# Patient Record
Sex: MALE | Race: WHITE | HISPANIC OR LATINO | Employment: UNEMPLOYED | URBAN - METROPOLITAN AREA
[De-identification: names, ages, dates, MRNs, and addresses within clinical notes are randomized per-mention and may not be internally consistent; named-entity substitution may affect disease eponyms.]

---

## 2023-06-09 ENCOUNTER — HOSPITAL ENCOUNTER (EMERGENCY)
Facility: CLINIC | Age: 43
Discharge: HOME | End: 2023-06-09
Attending: PHYSICIAN ASSISTANT | Admitting: PHYSICIAN ASSISTANT

## 2023-06-09 VITALS
TEMPERATURE: 98.3 F | HEART RATE: 72 BPM | DIASTOLIC BLOOD PRESSURE: 72 MMHG | SYSTOLIC BLOOD PRESSURE: 118 MMHG | OXYGEN SATURATION: 98 % | HEIGHT: 67 IN | RESPIRATION RATE: 18 BRPM

## 2023-06-09 DIAGNOSIS — S61.216A LACERATION OF RIGHT LITTLE FINGER WITHOUT DAMAGE TO NAIL, FOREIGN BODY PRESENCE UNSPECIFIED, INITIAL ENCOUNTER: ICD-10-CM

## 2023-06-09 PROCEDURE — 12002 RPR S/N/AX/GEN/TRNK2.6-7.5CM: CPT

## 2023-06-09 PROCEDURE — 250N000011 HC RX IP 250 OP 636: Performed by: PHYSICIAN ASSISTANT

## 2023-06-09 PROCEDURE — 99283 EMERGENCY DEPT VISIT LOW MDM: CPT

## 2023-06-09 PROCEDURE — 90715 TDAP VACCINE 7 YRS/> IM: CPT | Performed by: PHYSICIAN ASSISTANT

## 2023-06-09 PROCEDURE — 90471 IMMUNIZATION ADMIN: CPT | Performed by: PHYSICIAN ASSISTANT

## 2023-06-09 RX ORDER — BUPIVACAINE HYDROCHLORIDE 5 MG/ML
5 INJECTION, SOLUTION PERINEURAL ONCE
Status: DISCONTINUED | OUTPATIENT
Start: 2023-06-09 | End: 2023-06-09

## 2023-06-09 RX ORDER — BUPIVACAINE HYDROCHLORIDE 5 MG/ML
5 INJECTION, SOLUTION EPIDURAL; INTRACAUDAL ONCE
Status: DISCONTINUED | OUTPATIENT
Start: 2023-06-09 | End: 2023-06-09 | Stop reason: HOSPADM

## 2023-06-09 RX ADMIN — CLOSTRIDIUM TETANI TOXOID ANTIGEN (FORMALDEHYDE INACTIVATED), CORYNEBACTERIUM DIPHTHERIAE TOXOID ANTIGEN (FORMALDEHYDE INACTIVATED), BORDETELLA PERTUSSIS TOXOID ANTIGEN (GLUTARALDEHYDE INACTIVATED), BORDETELLA PERTUSSIS FILAMENTOUS HEMAGGLUTININ ANTIGEN (FORMALDEHYDE INACTIVATED), BORDETELLA PERTUSSIS PERTACTIN ANTIGEN, AND BORDETELLA PERTUSSIS FIMBRIAE 2/3 ANTIGEN 0.5 ML: 5; 2; 2.5; 5; 3; 5 INJECTION, SUSPENSION INTRAMUSCULAR at 18:33

## 2023-06-09 ASSESSMENT — ACTIVITIES OF DAILY LIVING (ADL): ADLS_ACUITY_SCORE: 33

## 2023-06-09 NOTE — ED PROVIDER NOTES
"  History     Chief Complaint:  Laceration     Pleasant 43 y/o RHD Irish speaking male presents with his son (who is translating) for evaluation of right small finger laceration sustained just PTA while at home.  Cutting a piece of plastic with his pocket knife, slipped and cut finger.  Denies trouble moving finger.  Very distal finger tip feels a little tingly.  Unsure last TET.      Not anticoagulated  No hx of DM    Local resident    Son at BS  No PCP       The history is provided by the patient and medical records. A  was used (Irish, son directly translating (they decline interp service)).      Independent Historian:   Patient provides history (son translating)    Review of External Notes:   No records in [facility]/ for [facility].     Medications:    No current outpatient medications on file.    Past Medical History:    No past medical history on file.    Past Surgical History:    No past surgical history on file.     Physical Exam   Patient Vitals for the past 24 hrs:   BP Temp Temp src Pulse Resp SpO2 Height   06/09/23 1544 118/72 98.3  F (36.8  C) Temporal 72 18 98 % 1.702 m (5' 7\")      Physical Exam  Vitals and nursing note reviewed.   Constitutional:       General: He is not in acute distress.     Appearance: Normal appearance. He is not ill-appearing, toxic-appearing or diaphoretic.   HENT:      Head: Normocephalic.      Right Ear: External ear normal.      Left Ear: External ear normal.      Mouth/Throat:      Mouth: Mucous membranes are moist.   Eyes:      Conjunctiva/sclera: Conjunctivae normal.   Pulmonary:      Effort: Pulmonary effort is normal.   Musculoskeletal:         General: Normal range of motion.      Cervical back: Normal range of motion.      Comments: Right hand/small finger: see pic.  ~3-4 cm linear oblique lying laceration over volar surface of distal finger tip, crossed PIP. No active bleeding.  Can F/E finger against resistance.  Sensation grossly intact " to light touch to finger tip.  Brisk cap refill.     Skin:     General: Skin is warm.      Capillary Refill: Capillary refill takes less than 2 seconds.   Neurological:      General: No focal deficit present.      Mental Status: He is alert.   Psychiatric:         Mood and Affect: Mood normal.         Behavior: Behavior normal.           Emergency Department Course   Procedures     Laceration Repair      Procedure: Laceration Repair    Indication: Laceration    Consent: Verbal    Location: Right R fifth (pinky) finger    Length: ~3 cm    Preparation: Irrigation with Sterile Saline.    Anesthesia/Sedation: Digital block: 1% lido plain and 0.5% marcaine plain, 1:1 ratio, 5mls.  Betadine prep.  LET also applied directly to wound area.      Treatment/Exploration: Wound explored, no foreign bodies found Through skin into subcutaneous only, not through.     Closure: The wound was closed with one layer. Skin/superficial layer was closed with 4 x 5-0 Nylon using Interrupted sutures.     Patient Status: The patient tolerated the procedure well: Yes. There were no complications.    Emergency Department Course & Assessments:       Interventions:  Medications   lido-EPINEPHrine-tetracaine (LET) topical gel GEL (has no administration in time range)   lidocaine 1 % 5 mL (has no administration in time range)   Tdap (tetanus-diphtheria-acell pertussis) (ADACEL) injection 0.5 mL (has no administration in time range)   bupivacaine (MARCAINE) 0.5% preservative free injection (has no administration in time range)      Assessments:  1637: Eval   1721: Digital block to finger.   1813: Lac repair. Discussed dispo plan.     Social Determinants of Health affecting care:   Language barrier  No primary care provider established    Disposition:  The patient was discharged to home.     Impression & Plan      Medical Decision Making:  Pleasant 41 y/o RHD Bahraini speaking male presents with his son (who is translating) for evaluation of right  small finger laceration sustained just PTA while at home.  Cutting a piece of plastic with his pocket knife, slipped and cut finger.  Denies trouble moving finger.  Very distal finger tip feels a little tingly.  Unsure last TET.      Exam as above.  Full function of finger and N/V intact (reports mild tingling to finger tip but has sensation to light touch).  Will update TET.  Block finger.  Explore wound, irrigate and repair.  Not felt XR will  as no concern for Fx/FB with mechanism/exam.  They are happy with plan.     Update: wound explored and repaired as above.  Wound into subcutaneous not through, no tendon injury visualized (wound not to that depth) or FB visualized, irrigated by ED tech and again by me.  Closed as above. Discussed importance of obtaining a PCP, provided contact to obtain. Discussed sutures should be removed in 7-10 days with new PCP clinic, any local [facility], or can return here if needed.  Pt educated on S/S of infection and the importance of wound care.  Pt educated on S/S that should prompt ED re-eval.  Questions answered. Verbalized understanding. Comfortable with plan and appreciative.   18 y/o son remains at  and provides translation.     Diagnosis:    ICD-10-CM    1. Laceration of right little finger without damage to nail, foreign body presence unspecified, initial encounter  S61.216A          Discharge Medications:  New Prescriptions    No medications on file      Maura Chand PA-C  6/9/2023   Maura Chand PA-C Medure, Leah M, PA-C  06/09/23 0787

## 2023-06-09 NOTE — ED TRIAGE NOTES
Pt arrives ambulatory for right pinky laceration that occurred while using a  around 1400. Reports slightly diminished sensation in tip of right pinky and pain that radiates to wrist 4/10. No allergies and reports no daily medications. Unsure of last tetanus shot. 1.5cm laceration noted to right pinky. Cleaned and dressed in triage.

## 2023-06-09 NOTE — DISCHARGE INSTRUCTIONS
EXPLANATION:  The provider has evaluated and repaired your finger laceration with 4 sutures that should be removed in 7-10 days.     HOME CARE:  Wash the wound daily with warm water and dial soap.  Pat dry.  Apply a thin layer of topical antibiotic ointment to the wound area (bacitracin, available over the counter).  Then apply new/clean dressing.  You may take Motrin and/or Tylenol as needed for discomfort.  These medications are available over the counter, use as directed on the label.    RETURN to the EMERGENCY DEPARTMENT if you develop any of the following:  Fever (temp 100.4 or higher)  Redness, swelling, or pus drainage from the wound  Red streak traveling up your hand from the wound area  Trouble moving your finger or hand due to pain, swelling, or weakness  Trouble breathing  Confusion  Vomiting    The examination and treatment you have received in the Emergency Department has been rendered on an emergency basis only and not intended to be a substitute for complete ongoing medical care.

## 2023-06-09 NOTE — ED NOTES
Emergency Department Technician Wound Irrigation Note:    6/9/2023    6:08 PM      Wound location:  finger    Irrigation Fluid: Normal Saline    Estimated Irrigation Volume (60 mL fluid per cm): 100mL    Jennifer Vargas

## 2023-06-17 ENCOUNTER — HOSPITAL ENCOUNTER (EMERGENCY)
Facility: CLINIC | Age: 43
Discharge: HOME | End: 2023-06-17
Admitting: EMERGENCY MEDICINE

## 2023-06-17 VITALS
HEART RATE: 91 BPM | RESPIRATION RATE: 17 BRPM | OXYGEN SATURATION: 96 % | DIASTOLIC BLOOD PRESSURE: 80 MMHG | SYSTOLIC BLOOD PRESSURE: 118 MMHG | TEMPERATURE: 98.4 F

## 2023-06-17 PROCEDURE — 99281 EMR DPT VST MAYX REQ PHY/QHP: CPT

## 2023-06-18 NOTE — ED TRIAGE NOTES
Pt presents for suture removal. Pt states he has had stitches in for 8 days. Finger w/ stitches appears clean, intact and no signs of infection. RN removed 4 stitches.

## 2025-03-29 ENCOUNTER — APPOINTMENT (OUTPATIENT)
Dept: CT IMAGING | Facility: CLINIC | Age: 45
End: 2025-03-29
Attending: EMERGENCY MEDICINE

## 2025-03-29 ENCOUNTER — HOSPITAL ENCOUNTER (EMERGENCY)
Facility: CLINIC | Age: 45
Discharge: HOME | End: 2025-03-29
Attending: EMERGENCY MEDICINE | Admitting: EMERGENCY MEDICINE

## 2025-03-29 ENCOUNTER — APPOINTMENT (OUTPATIENT)
Dept: MRI IMAGING | Facility: CLINIC | Age: 45
End: 2025-03-29
Attending: PHYSICIAN ASSISTANT

## 2025-03-29 VITALS
RESPIRATION RATE: 18 BRPM | HEART RATE: 75 BPM | TEMPERATURE: 97.1 F | BODY MASS INDEX: 34.94 KG/M2 | DIASTOLIC BLOOD PRESSURE: 88 MMHG | WEIGHT: 235.89 LBS | SYSTOLIC BLOOD PRESSURE: 119 MMHG | OXYGEN SATURATION: 98 % | HEIGHT: 69 IN

## 2025-03-29 DIAGNOSIS — G51.0 BELL'S PALSY: ICD-10-CM

## 2025-03-29 LAB
ANION GAP SERPL CALCULATED.3IONS-SCNC: 10 MMOL/L (ref 7–15)
APTT PPP: 29 SECONDS (ref 22–38)
BASOPHILS # BLD AUTO: 0.1 10E3/UL (ref 0–0.2)
BASOPHILS NFR BLD AUTO: 1 %
BUN SERPL-MCNC: 14.6 MG/DL (ref 6–20)
CALCIUM SERPL-MCNC: 8.6 MG/DL (ref 8.8–10.4)
CHLORIDE SERPL-SCNC: 101 MMOL/L (ref 98–107)
CREAT SERPL-MCNC: 0.8 MG/DL (ref 0.67–1.17)
EGFRCR SERPLBLD CKD-EPI 2021: >90 ML/MIN/1.73M2
EOSINOPHIL # BLD AUTO: 0.7 10E3/UL (ref 0–0.7)
EOSINOPHIL NFR BLD AUTO: 8 %
ERYTHROCYTE [DISTWIDTH] IN BLOOD BY AUTOMATED COUNT: 12.6 % (ref 10–15)
GLUCOSE SERPL-MCNC: 102 MG/DL (ref 70–99)
HCO3 SERPL-SCNC: 23 MMOL/L (ref 22–29)
HCT VFR BLD AUTO: 40.7 % (ref 40–53)
HGB BLD-MCNC: 14.2 G/DL (ref 13.3–17.7)
HOLD SPECIMEN: NORMAL
IMM GRANULOCYTES # BLD: 0 10E3/UL
IMM GRANULOCYTES NFR BLD: 0 %
INR PPP: 0.98 (ref 0.85–1.15)
LYMPHOCYTES # BLD AUTO: 3.2 10E3/UL (ref 0.8–5.3)
LYMPHOCYTES NFR BLD AUTO: 38 %
MCH RBC QN AUTO: 29.8 PG (ref 26.5–33)
MCHC RBC AUTO-ENTMCNC: 34.9 G/DL (ref 31.5–36.5)
MCV RBC AUTO: 85 FL (ref 78–100)
MONOCYTES # BLD AUTO: 0.6 10E3/UL (ref 0–1.3)
MONOCYTES NFR BLD AUTO: 8 %
NEUTROPHILS # BLD AUTO: 3.8 10E3/UL (ref 1.6–8.3)
NEUTROPHILS NFR BLD AUTO: 45 %
NRBC # BLD AUTO: 0 10E3/UL
NRBC BLD AUTO-RTO: 0 /100
PLATELET # BLD AUTO: 276 10E3/UL (ref 150–450)
POTASSIUM SERPL-SCNC: 3.5 MMOL/L (ref 3.4–5.3)
RBC # BLD AUTO: 4.77 10E6/UL (ref 4.4–5.9)
SODIUM SERPL-SCNC: 134 MMOL/L (ref 135–145)
TROPONIN T SERPL HS-MCNC: <6 NG/L
WBC # BLD AUTO: 8.5 10E3/UL (ref 4–11)

## 2025-03-29 PROCEDURE — 85610 PROTHROMBIN TIME: CPT | Performed by: EMERGENCY MEDICINE

## 2025-03-29 PROCEDURE — A9585 GADOBUTROL INJECTION: HCPCS | Performed by: PHYSICIAN ASSISTANT

## 2025-03-29 PROCEDURE — 250N000012 HC RX MED GY IP 250 OP 636 PS 637: Performed by: EMERGENCY MEDICINE

## 2025-03-29 PROCEDURE — 99291 CRITICAL CARE FIRST HOUR: CPT | Mod: 25

## 2025-03-29 PROCEDURE — 80048 BASIC METABOLIC PNL TOTAL CA: CPT | Performed by: EMERGENCY MEDICINE

## 2025-03-29 PROCEDURE — 82962 GLUCOSE BLOOD TEST: CPT

## 2025-03-29 PROCEDURE — 84484 ASSAY OF TROPONIN QUANT: CPT | Performed by: EMERGENCY MEDICINE

## 2025-03-29 PROCEDURE — 85014 HEMATOCRIT: CPT | Performed by: EMERGENCY MEDICINE

## 2025-03-29 PROCEDURE — 70553 MRI BRAIN STEM W/O & W/DYE: CPT

## 2025-03-29 PROCEDURE — 36415 COLL VENOUS BLD VENIPUNCTURE: CPT | Performed by: EMERGENCY MEDICINE

## 2025-03-29 PROCEDURE — G0508 CRIT CARE TELEHEA CONSULT 60: HCPCS | Mod: FS | Performed by: PHYSICIAN ASSISTANT

## 2025-03-29 PROCEDURE — 250N000009 HC RX 250: Performed by: EMERGENCY MEDICINE

## 2025-03-29 PROCEDURE — 70450 CT HEAD/BRAIN W/O DYE: CPT

## 2025-03-29 PROCEDURE — 85004 AUTOMATED DIFF WBC COUNT: CPT | Performed by: EMERGENCY MEDICINE

## 2025-03-29 PROCEDURE — 85730 THROMBOPLASTIN TIME PARTIAL: CPT | Performed by: EMERGENCY MEDICINE

## 2025-03-29 PROCEDURE — 93005 ELECTROCARDIOGRAM TRACING: CPT

## 2025-03-29 PROCEDURE — 255N000002 HC RX 255 OP 636: Performed by: PHYSICIAN ASSISTANT

## 2025-03-29 PROCEDURE — 250N000011 HC RX IP 250 OP 636: Performed by: EMERGENCY MEDICINE

## 2025-03-29 PROCEDURE — 70496 CT ANGIOGRAPHY HEAD: CPT

## 2025-03-29 RX ORDER — IOPAMIDOL 755 MG/ML
67 INJECTION, SOLUTION INTRAVASCULAR ONCE
Status: COMPLETED | OUTPATIENT
Start: 2025-03-29 | End: 2025-03-29

## 2025-03-29 RX ORDER — PREDNISONE 20 MG/1
60 TABLET ORAL ONCE
Status: COMPLETED | OUTPATIENT
Start: 2025-03-29 | End: 2025-03-29

## 2025-03-29 RX ORDER — PREDNISONE 20 MG/1
60 TABLET ORAL DAILY
Qty: 18 TABLET | Refills: 0 | Status: SHIPPED | OUTPATIENT
Start: 2025-03-30 | End: 2025-04-05

## 2025-03-29 RX ORDER — LIGHT MINERAL OIL, WHITE PETROLATUM 150; 850 MG/G; MG/G
OINTMENT OPHTHALMIC
Qty: 1 G | Refills: 0 | Status: SHIPPED | OUTPATIENT
Start: 2025-03-29

## 2025-03-29 RX ORDER — VALACYCLOVIR HYDROCHLORIDE 1 G/1
1000 TABLET, FILM COATED ORAL 3 TIMES DAILY
Qty: 21 TABLET | Refills: 0 | Status: SHIPPED | OUTPATIENT
Start: 2025-03-30 | End: 2025-04-06

## 2025-03-29 RX ORDER — IOPAMIDOL 755 MG/ML
500 INJECTION, SOLUTION INTRAVASCULAR ONCE
Status: DISCONTINUED | OUTPATIENT
Start: 2025-03-29 | End: 2025-03-29

## 2025-03-29 RX ORDER — GADOBUTROL 604.72 MG/ML
11 INJECTION INTRAVENOUS ONCE
Status: COMPLETED | OUTPATIENT
Start: 2025-03-29 | End: 2025-03-29

## 2025-03-29 RX ORDER — CARBOXYMETHYLCELLULOSE SODIUM 5 MG/ML
SOLUTION/ DROPS OPHTHALMIC
Qty: 1 EACH | Refills: 0 | Status: SHIPPED | OUTPATIENT
Start: 2025-03-29

## 2025-03-29 RX ADMIN — PREDNISONE 60 MG: 20 TABLET ORAL at 20:00

## 2025-03-29 RX ADMIN — GADOBUTROL 11 ML: 604.72 INJECTION INTRAVENOUS at 17:46

## 2025-03-29 RX ADMIN — SODIUM CHLORIDE 80 ML: 9 INJECTION, SOLUTION INTRAVENOUS at 15:44

## 2025-03-29 RX ADMIN — IOPAMIDOL 67 ML: 755 INJECTION, SOLUTION INTRAVENOUS at 15:43

## 2025-03-29 ASSESSMENT — COLUMBIA-SUICIDE SEVERITY RATING SCALE - C-SSRS
2. HAVE YOU ACTUALLY HAD ANY THOUGHTS OF KILLING YOURSELF IN THE PAST MONTH?: NO
6. HAVE YOU EVER DONE ANYTHING, STARTED TO DO ANYTHING, OR PREPARED TO DO ANYTHING TO END YOUR LIFE?: NO
1. IN THE PAST MONTH, HAVE YOU WISHED YOU WERE DEAD OR WISHED YOU COULD GO TO SLEEP AND NOT WAKE UP?: NO

## 2025-03-29 ASSESSMENT — ACTIVITIES OF DAILY LIVING (ADL)
ADLS_ACUITY_SCORE: 41

## 2025-03-29 NOTE — CONSULTS
"      [facility]     Stroke Code Note          History of Present Illness     Chief Complaint: Facial Droop      Luís Madrigal is a 44 year old male who presented to the [facility] emergency department today due to acute onset 20 minutes prior to arrival of right facial droop, Right eye vision \"off\", right face/arm numbness.  Reportedly face felt \"weird\" beginning at 10 AM but did not notice any deficits at that time.  /94.  No weakness on ED provider exam.    She reports that in the morning at 10 AM while he was speaking he felt that his mouth was turning to the side.  At 1130 he looked in the mirror and noticed that he had the facial droop.  Then right around noon before coming to the hospital he noticed that his right eye was closing/tearing.  His right eye vision seemed blurry.      He was evaluated via telemedicine along with vascular neurologist , Dr. Briscoe, following CT.  He still notes that the right eye vision appears blurry but no vision loss.  See exam below.  Notable for right upper and lower facial droop, right face arm/leg numbness, right arm/leg weakness appreciated by patient and noted on RN exam but not well-appreciated via telemedicine.  He denies any headache currently or earlier today.  He does usually get headaches and normally treats with Tylenol.  He had a headache yesterday and the day prior that was different from his usual headaches however.  Took Excedrin however it did not seem to help only resolved after falling asleep last night.  He denies any ear pain or rashes.  Denies any recent camping but [place]           Past Medical History     Stroke risk factors: Reportedly healthy without chronic medical issues, denies seeing doctor regularly or any prior diagnosis of HTN or diabetes    Preadmission antithrombotic regimen: No PTA medications    Modified Easton Score (Pre-morbid)  0 - No symptoms.                   " "Assessment and Plan       1.  R upper and lower facial droop, R face/arm/leg decreased sensation (50%), very mild R upper and lower extremity weakness, evaluate for stroke     Intravenous Thrombolysis  Not given due to:   - unclear or unfavorable risk-benefit profile for extended window thrombolysis beyond the conventional 4.5 hour time window     Endovascular Treatment  Not initiated due to absence of proximal vessel occlusion     Plan:  -MRI brain w/wo contrast, please page stroke team once resulted so we can review images and make further recommendations.    Discussed with vascular neurologist, Dr. Briscoe who felt if MRI negative for stroke then to evaluate for Bell's palsy.     ___________________________________________________________________    Lynne Lilly PA-C  Vascular Neurology    To page me or covering stroke neurology team member, click here: [facility]  Choose \"On Call\" tab at top, then select \"NEUROLOGY/ALL SITES\" from middle drop-down box, press Enter, then look for \"stroke\" or \"telestroke\" for your site.  ___________________________________________________________________        Imaging/Labs   (personally reviewed)    HEAD CT:  1.  No acute intracranial process.     HEAD CTA:   1.  No significant stenosis, aneurysm, or high flow vascular malformation identified.     NECK CTA:  1.  No hemodynamically significant stenosis in the neck vessels.     CTP: deferred as initially called as Tier 1 then no LVO present         Physical Examination     BP: 123/88   Pulse: 74   Resp: 16   Temp: 97.1  F (36.2  C)   Temp src: Temporal   SpO2: 96 %   O2 Device: None (Room air)   Weight: 107 kg (235 lb 14.3 oz)    Wt Readings from Last 2 Encounters:   03/29/25 107 kg (235 lb 14.3 oz)       General Exam  General:  patient lying in bed without any acute distress    HEENT:  normocephalic/atraumatic  Pulmonary:  no respiratory distress    Neuro Exam  Mental Status:  alert, oriented x 3, follows commands, speech clear " and fluent, naming and repetition normal  Cranial Nerves:  visual fields intact (tested by nurse) but patient reports R eye blurry vision- no missing parts to vision, EOMI with normal smooth pursuit, facial sensation intact and symmetric (tested by nurse), hearing not formally tested but intact to conversation, no dysarthria, tongue protrusion midline, R upper and lower facial droop (slightly slowed R eyebrow raise and R felt it was easier to open R eye than the Left for him while he squinted)  Motor:  no abnormal movements, able to move all limbs antigravity spontaneously with no signs of hemiparesis observed by this provider via telemedicine, no pronator drift ; however, RN indicated R  strength was weaker and patient feels subjective R arm and leg weakness, R appreciated muscle stregnth RLE slightly weaker (about 85% compared to the LLE)  Reflexes:  unable to test (telestroke)  Sensory:  endorses 50% sensation to R face/arm/leg, L sided sensation intactno extinction on double simultaneous stimulation (assessed by nurse)  Coordination:  normal finger-to-nose and heel-to-shin bilaterally without dysmetria  Station/Gait:  unable to test due to telestroke        Stroke Scales       NIHSS  1a. Level of Consciousness 0-->Alert, keenly responsive   1b. LOC Questions 0-->Answers both questions correctly   1c. LOC Commands 0-->Performs both tasks correctly   2.   Best Gaze 0-->Normal   3.   Visual (S) 0-->No visual loss (a bit blurry R eye only)   4.   Facial Palsy 2-->Partial paralysis (total or near-total paralysis of lower face)   5a. Motor Arm, Left 0-->No drift, limb holds 90 (or 45) degrees for full 10 secs   5b. Motor Arm, Right 0-->No drift, limb holds 90 (or 45) degrees for full 10 secs   6a. Motor Leg, Left 0-->No drift, leg holds 30 degree position for full 5 secs   6b. Motor Leg, right 0-->No drift, leg holds 30 degree position for full 5 secs   7.   Limb Ataxia 0-->Absent   8.   Sensory (S)  1-->Mild-to-moderate sensory loss, patient feels pinprick is less sharp or is dull on the affected side, or there is a loss of superficial pain with pinprick, but patient is aware of being touched (50% R face, arm and leg)   9.   Best Language 0-->No aphasia, normal   10. Dysarthria 0-->Normal   11. Extinction and Inattention  0-->No abnormality   Total 3 (03/29/25 1555)            Labs     CBC  Lab Results   Component Value Date    HGB 14.2 03/29/2025    HCT 40.7 03/29/2025    WBC 8.5 03/29/2025     03/29/2025       BMP  Lab Results   Component Value Date     (L) 03/29/2025    POTASSIUM 3.5 03/29/2025    CHLORIDE 101 03/29/2025    CO2 23 03/29/2025    BUN 14.6 03/29/2025    CR 0.80 03/29/2025     (H) 03/29/2025    CECY 8.6 (L) 03/29/2025       INR  INR   Date Value Ref Range Status   03/29/2025 0.98 0.85 - 1.15 Final       Data   Stroke Code Data  (for stroke code with tele)  Stroke code activated 03/29/25  1536   First stroke provider response 03/29/25  1539   Video start time 03/29/25  1555   Video end time 03/29/25  1638   Last known normal 03/29/25  0959   Time of discovery (or onset of symptoms)  03/29/25  1000   Head CT read by Stroke Neuro Provider 03/29/25  1544   Was stroke code de-escalated? Yes  03/29/25  1640     Telestroke Service Details  Type of service telemedicine diagnostic assessment of acute neurological changes   Reason telemedicine is appropriate patient requires assessment with a specialist for diagnosis and treatment of neurological symptoms   Mode of transmission secure interactive audio and video communication per Angel   Originating site (patient location) [facility]    Distant site (provider location) [facility]        Clinically Significant Risk Factors Present on Admission         # Hyponatremia: Lowest Na = 134 mmol/L in last 2 days, will monitor as appropriate                     # Obesity: Estimated  "body mass index is 34.84 kg/m  as calculated from the following:    Height as of this encounter: 1.753 m (5' 9\").    Weight as of this encounter: 107 kg (235 lb 14.3 oz).                Time Spent on this Encounter   Billing: I personally examined and evaluated the patient today. At the time of my evaluation and management the patient was critical condition today due to stroke code. I personally managed review of chart, medical record, meds, imaging, history, exam, and discussion with attending regarding plan and documentation. I spent a total of 70 minutes providing critical care services, evaluating the patient, directing care and reviewing laboratory values and radiologic reports.   "

## 2025-03-29 NOTE — ED NOTES
MRI brain neg for any acute ischemic stroke      No further stroke work up, further management of possible Bell's per ED.         Wagner Lundberg  Stroke Fellow

## 2025-03-29 NOTE — ED TRIAGE NOTES
Difficulty speaking and right sided facial droop since 10 am. Vision blurry in right eye. Symptoms more pronounced since 1510.  in triage.    Tier 1 code stroke called at 1534.    Dr. Bojorquez to room at 1536.

## 2025-03-29 NOTE — ED PROVIDER NOTES
"  Emergency Department Note      History of Present Illness     Chief Complaint   Facial Droop      HPI   Luís Madrigal is a 44 year old male who presents with a facial droop. Around 1000 today the patient began to \"feel weird\". Then around 1510 he developed a right sided facial droop, right facial and arm numbness, right eye blurriness. He denies any chest pain, shortness of breath, headache, falls, injury.    Independent Historian   None    Review of External Notes   None     Past Medical History     Medical History and Problem List   No past medical history on file.    Medications   The patient is not taking any routine medications     Surgical History   No past surgical history on file.    Physical Exam     Patient Vitals for the past 24 hrs:   BP Temp Temp src Pulse Resp SpO2 Height Weight   03/29/25 2000 119/88 -- -- 75 -- 98 % -- --   03/29/25 1948 116/84 -- -- 74 -- 97 % -- --   03/29/25 1928 118/85 -- -- 67 -- 98 % -- --   03/29/25 1915 120/89 -- -- 81 -- 97 % -- --   03/29/25 1900 119/86 -- -- 80 -- 97 % -- --   03/29/25 1843 127/90 -- -- 73 -- 98 % -- --   03/29/25 1830 129/89 -- -- 75 -- 97 % -- --   03/29/25 1821 128/83 -- -- 70 18 99 % -- --   03/29/25 1730 118/83 -- -- 64 -- 98 % -- --   03/29/25 1725 -- -- -- 63 -- 99 % -- --   03/29/25 1720 125/85 -- -- 58 -- -- -- --   03/29/25 1701 117/85 -- -- 67 -- 98 % -- --   03/29/25 1643 123/88 -- -- 74 -- 96 % -- --   03/29/25 1629 125/84 -- -- 77 -- 98 % -- --   03/29/25 1615 (!) 124/91 -- -- 81 -- -- -- --   03/29/25 1611 -- -- -- -- -- 99 % -- --   03/29/25 1603 123/85 -- -- 75 -- 97 % -- --   03/29/25 1533 (!) 148/94 97.1  F (36.2  C) Temporal 84 16 98 % 1.753 m (5' 9\") 107 kg (235 lb 14.3 oz)     Physical Exam  General: No acute distress  Head: No obvious trauma to head.  Ears, Nose, Throat:  External ears normal.  Nose normal.  No pharyngeal erythema, swelling or exudate.  Midline uvula. Moist mucus membranes.  Eyes:  Conjunctivae clear. EOMI PERRL no " nystagmus.   Neck: Normal range of motion.  Neck supple.   CV: Regular rate and rhythm.  No murmurs.      Respiratory: Effort normal and breath sounds normal.  No wheezing or crackles.   Gastrointestinal: Soft.  No distension. There is no tenderness.  There is no rigidity, no rebound and no guarding.   Musculoskeletal: Normal range of motion.  Non tender extremities to palpations. No lower extremity edema  Neuro: Alert. Moving all extremities appropriately.  Normal speech. CN II-XII grossly intact, except for right facial droop and decreased sensation of the right face. No pronator drift, normal finger-nose-finger, visual fields intact.  Gross muscle strength intact of the proximal and distal bilateral upper and lower extremities. Sensation diminished in right upper extremity.  Normal gait.      Skin: Skin is warm and dry.  No rash noted.   Psych: Normal mood and affect. Behavior is normal.     Diagnostics     Lab Results   Labs Ordered and Resulted from Time of ED Arrival to Time of ED Departure   BASIC METABOLIC PANEL - Abnormal       Result Value    Sodium 134 (*)     Potassium 3.5      Chloride 101      Carbon Dioxide (CO2) 23      Anion Gap 10      Urea Nitrogen 14.6      Creatinine 0.80      GFR Estimate >90      Calcium 8.6 (*)     Glucose 102 (*)    INR - Normal    INR 0.98     PARTIAL THROMBOPLASTIN TIME - Normal    aPTT 29     TROPONIN T, HIGH SENSITIVITY - Normal    Troponin T, High Sensitivity <6     GLUCOSE MONITOR NURSING POCT   CBC WITH PLATELETS AND DIFFERENTIAL    WBC Count 8.5      RBC Count 4.77      Hemoglobin 14.2      Hematocrit 40.7      MCV 85      MCH 29.8      MCHC 34.9      RDW 12.6      Platelet Count 276      % Neutrophils 45      % Lymphocytes 38      % Monocytes 8      % Eosinophils 8      % Basophils 1      % Immature Granulocytes 0      NRBCs per 100 WBC 0      Absolute Neutrophils 3.8      Absolute Lymphocytes 3.2      Absolute Monocytes 0.6      Absolute Eosinophils 0.7       Absolute Basophils 0.1      Absolute Immature Granulocytes 0.0      Absolute NRBCs 0.0         Imaging   MR Brain w/o & w Contrast   Final Result   IMPRESSION:   1.  No acute or subacute ischemic change.   2.  No acute intracranial process or abnormal enhancement.         CTA Head Neck with Contrast   Final Result   IMPRESSION:    HEAD CT:   1.  No acute intracranial process.      HEAD CTA:    1.  No significant stenosis, aneurysm, or high flow vascular malformation identified.      NECK CTA:   1.  No hemodynamically significant stenosis in the neck vessels.          These findings were discussed with Dr Bojorquez at 4:07 PM CST on March 29, 2025.      CT Head w/o Contrast   Final Result   IMPRESSION:    HEAD CT:   1.  No acute intracranial process.      HEAD CTA:    1.  No significant stenosis, aneurysm, or high flow vascular malformation identified.      NECK CTA:   1.  No hemodynamically significant stenosis in the neck vessels.          These findings were discussed with Dr Bojorquez at 4:07 PM CST on March 29, 2025.          EKG   ECG taken at 1637, ECG read at 1646  Normal sinus rhythm   Cannot rule out inferior infarct, age undetermined   Abnormal ECG   Rate 78 bpm. HI interval 188 ms. QRS duration 110 ms. QT/QTc   388/442 ms. P-R-T axes 41 -1 28.    Independent Interpretation   None    ED Course      Medications Administered   Medications   iopamidol (ISOVUE-370) solution 67 mL (67 mLs Intravenous $Given 3/29/25 1543)   sodium chloride 0.9 % bag for CT scan flush (80 mLs Intravenous $Given 3/29/25 1544)   gadobutrol (GADAVIST) injection 11 mL (11 mLs Intravenous $Given 3/29/25 6176)   predniSONE (DELTASONE) tablet 60 mg (60 mg Oral $Given 3/29/25 2000)       Procedures   Procedures     Discussion of Management   See ED course     ED Course   ED Course as of 03/29/25 2237   Sat Mar 29, 2025   1534 I initially assessed the patient and obtained the above history and physical exam.    1541 I consulted with stroke  neurology about the patient and plan of care    1607 I consulted with  radiology about the patient and plan of care   1641 I consulted with stroke neurology about the patient and plan of care   1844 I talked with stroke neurology about the patient and plan of care   1908 I rechecked the patient      Additional Documentation  None    Medical Decision Making / Diagnosis     CMS Diagnoses: None    MIPS       None    [facility] [place]   Luís Madrigal is a 44 year old male presenting with facial droop.  He also endorses tingling sensation in the extremities.  Tier 1 code stroke is called.  CT scan is negative for acute stroke.  I discussed the patient with stroke neurology and radiology.  Stroke neurology recommends an MRI.  MRI is negative for acute stroke.  It is subtle, but the patient does have decreased movement of the upper face, which could be explained by Bell's palsy.  Given the stroke workup is negative, Bell's palsy is likely.  No neurodeficits are noted on exam of the extremities.  He is given a prescription for valacyclovir, prednisone.  He does not have a primary care provider but states he will schedule an appointment at the same clinic as his wife.  Return precautions are given and he verbalizes understanding.  He is discharged home in stable condition.    Disposition   The patient was discharged.     Diagnosis     ICD-10-CM    1. Bell's palsy  G51.0            Discharge Medications   Discharge Medication List as of 3/29/2025  8:11 PM        START taking these medications    Details   carboxymethylcellulose PF (REFRESH PLUS) 0.5 % ophthalmic solution 1-2 drops in the affected eye every 2 hours as needed for dry eyes., Disp-1 each, R-0, E-PrescribeMay substitute other preservative free artificial tears as needed for availability and insurance coverage.      petrolatum (PURALUBE) ophthalmic ointment 1-2 drops in the affected eye every bedtime as needed for dry eyes.Disp-1 g, [id number] substitute other  preservative free ophthalmic lubricating ointment as needed for availability and insurance coverage.      predniSONE (DELTASONE) 20 MG tablet Take 3 tablets (60 mg) by mouth daily for 6 days., Disp-18 tablet, R-0, E-Prescribe      valACYclovir (VALTREX) 1000 mg tablet Take 1 tablet (1,000 mg) by mouth 3 times daily for 7 days., Disp-21 tablet, R-0, E-Prescribe           Scribe Disclosure:  I, Ritesh Logan, am serving as a scribe at 3:41 PM on 3/29/2025 to document services personally performed by Ananth Bojorquez MD based on my observations and the provider's statements to me.        Ananth Bojorquez MD  03/29/25 [id number]

## 2025-03-30 LAB — GLUCOSE BLDC GLUCOMTR-MCNC: 146 MG/DL (ref 70–99)

## 2025-03-30 NOTE — DISCHARGE INSTRUCTIONS
You did not have a stroke.  New have Bell's palsy which is decreased strength and sensation in 1 side of the face.  We are giving a prescription for steroids and an antiviral medication.  Symptoms generally improve but they can take several weeks.  We recommend that you establish care with a primary care provider to follow-up.  Return the emergency department if you develop any new or concerning symptoms.

## 2025-03-31 LAB
ATRIAL RATE - MUSE: 78 BPM
DIASTOLIC BLOOD PRESSURE - MUSE: NORMAL MMHG
INTERPRETATION ECG - MUSE: NORMAL
P AXIS - MUSE: 41 DEGREES
PR INTERVAL - MUSE: 188 MS
QRS DURATION - MUSE: 110 MS
QT - MUSE: 388 MS
QTC - MUSE: 442 MS
R AXIS - MUSE: -1 DEGREES
SYSTOLIC BLOOD PRESSURE - MUSE: NORMAL MMHG
T AXIS - MUSE: 28 DEGREES
VENTRICULAR RATE- MUSE: 78 BPM

## 2025-06-08 ENCOUNTER — HEALTH MAINTENANCE LETTER (OUTPATIENT)
Age: 45
End: 2025-06-08